# Patient Record
(demographics unavailable — no encounter records)

---

## 2017-05-07 NOTE — CP.PCM.HP
History of Present Illness





- History of Present Illness


History of Present Illness: 


Pt is a 39 y/o male with a past medical history of diverticulitis (3yrs ago) 

and pre-diabetes presents to the emergency department with a complaint of a 

lower abdominal pain for the past 2 days. Associated with tactile fever as per 

history from wife. Patient states pain had worsened since onset, is constant, 

and similar to pain when he had diverticulitis. wife states he tooks some 

Percocet and Amoxicillin (left over from previous conditions)with no relief. 

Denies nausea, vomiting, diarrhea, constipation, or urinary symptom





PCP- [facility]





Present on Admission





- Present on Admission


Any Indicators Present on Admission: No





Review of Systems





- Review of Systems


All systems: reviewed and no additional remarkable complaints except


Review of Systems: 





Per HPI 





Past Patient History





- Infectious Disease


Hx of Infectious Diseases: None





- Tetanus Immunizations


Tetanus Immunization: Unknown





- Past Medical History & Family History


Past Medical History?: Yes





- Past Social History


Alcohol: None


Drugs: Denies





- CARDIAC


Hx Cardiac Disorders: No





- PULMONARY


Hx Respiratory Disorders: No





- NEUROLOGICAL


Hx Neurological Disorder: Yes





- HEENT


Hx HEENT Problems: No





- RENAL


Hx Chronic Kidney Disease: No





- ENDOCRINE/METABOLIC


Hx Endocrine Disorders: No





- HEMATOLOGICAL/ONCOLOGICAL


Hx Blood Disorders: No





- INTEGUMENTARY


Hx Dermatological Problems: No





- MUSCULOSKELETAL/RHEUMATOLOGICAL


Hx Musculoskeletal Disorders: No





- GASTROINTESTINAL


Hx Diverticulitis: Yes





- GENITOURINARY/GYNECOLOGICAL


Hx Genitourinary Disorders: No





- PSYCHIATRIC


Hx Psychophysiologic Disorder: No





- SURGICAL HISTORY


Hx Surgeries: No





- ANESTHESIA


Hx Anesthesia: No





Meds


Allergies/Adverse Reactions: 


 Allergies











Allergy/AdvReac Type Severity Reaction Status Date / Time


 


FISH Allergy  RASH Verified 05/07/17 18:18


 


Fish Containing Products Allergy  RASH Verified 05/07/17 18:18














Physical Exam





- Constitutional


Appears: Non-toxic, No Acute Distress





- Head Exam


Head Exam: NORMOCEPHALIC





- Eye Exam


Eye Exam: Normal appearance





- ENT Exam


ENT Exam: Mucous Membranes Moist





- Respiratory Exam


Respiratory Exam: Clear to Auscultation Bilateral, NORMAL BREATHING PATTERN.  

absent: Rhonchi, Wheezes





- Cardiovascular Exam


Cardiovascular Exam: REGULAR RHYTHM, +S1, +S2





- GI/Abdominal Exam


GI & Abdominal Exam: Normal Bowel Sounds, Soft, Tenderness.  absent: Distended, 

Guarding





- Extremities Exam


Extremities exam: Negative for: calf tenderness, pedal edema





- Neurological Exam


Neurological exam: Alert, CN II-XII Intact, Oriented x3





Results





- Vital Signs


Recent Vital Signs: 





 Last Vital Signs











Temp  98.7 F   05/07/17 22:43


 


Pulse  88   05/07/17 22:43


 


Resp  20   05/07/17 22:43


 


BP  135/82   05/07/17 22:43


 


Pulse Ox  98   05/07/17 21:33














- Labs


Result Diagrams: 


 05/07/17 18:55





 05/07/17 18:55


Labs: 





 Laboratory Results - last 24 hr











  05/07/17 05/07/17 05/07/17





  18:55 18:55 18:55


 


WBC  13.6 H  


 


RBC  5.14  


 


Hgb  14.6  


 


Hct  42.4  


 


MCV  82.4  


 


MCH  28.4  


 


MCHC  34.4  


 


RDW  13.5  


 


Plt Count  285  


 


MPV  9.4  


 


Neut % (Auto)  67.4  


 


Lymph % (Auto)  18.6 L  


 


Mono % (Auto)  11.3 H  


 


Eos % (Auto)  2.2  


 


Baso % (Auto)  0.5  


 


Neut #  9.2 H  


 


Lymph #  2.5  


 


Mono #  1.5 H  


 


Eos #  0.3  


 


Baso #  0.1  


 


PT   


 


INR   


 


APTT   


 


Sodium   142 


 


Potassium   3.8 


 


Chloride   104 


 


Carbon Dioxide   25 


 


Anion Gap   17 


 


BUN   17 


 


Creatinine   0.9 


 


Est GFR ( Amer)   > 60 


 


Est GFR (Non-Af Amer)   > 60 


 


Random Glucose   100 


 


Lactic Acid    1.1


 


Calcium   9.0 


 


Total Bilirubin   0.3 


 


AST   22 


 


ALT   37 


 


Alkaline Phosphatase   88 


 


Total Protein   7.6 


 


Albumin   4.2 


 


Globulin   3.4 


 


Albumin/Globulin Ratio   1.2 


 


Lipase   34 














  05/07/17





  18:55


 


WBC 


 


RBC 


 


Hgb 


 


Hct 


 


MCV 


 


MCH 


 


MCHC 


 


RDW 


 


Plt Count 


 


MPV 


 


Neut % (Auto) 


 


Lymph % (Auto) 


 


Mono % (Auto) 


 


Eos % (Auto) 


 


Baso % (Auto) 


 


Neut # 


 


Lymph # 


 


Mono # 


 


Eos # 


 


Baso # 


 


PT  9.9


 


INR  0.95


 


APTT  27.5


 


Sodium 


 


Potassium 


 


Chloride 


 


Carbon Dioxide 


 


Anion Gap 


 


BUN 


 


Creatinine 


 


Est GFR ( Amer) 


 


Est GFR (Non-Af Amer) 


 


Random Glucose 


 


Lactic Acid 


 


Calcium 


 


Total Bilirubin 


 


AST 


 


ALT 


 


Alkaline Phosphatase 


 


Total Protein 


 


Albumin 


 


Globulin 


 


Albumin/Globulin Ratio 


 


Lipase 














Assessment & Plan





- Assessment and Plan (Free Text)


Assessment: 





39 y/o with previous history of diverticulitis being admitted for 

diverticulitis with no associated sepsis


Plan: 


Diverticulitis 


CT imaging positive for diverticulitis in sigmoid colon


Started on Cipro/flagyl


received a dose of Ampicillin in ED


IV fluids- received a bolus in ED, then D5NS at 100cc/hr


Pain control with Toradol 30 Q6hr for pain level 8-10, 15 Q6hrs pain level 4-7


Repeat Cbc in the am





Diet- NPO, till pain resolve





Dvt prophylaxis- SCDs (low risk)

## 2017-05-07 NOTE — ED PDOC
HPI: Abdomen


Time Seen by Provider: 17 18:23


Chief Complaint (Nursing): Abdominal Pain


Chief Complaint (Provider): Abdominal Pain


History Per: Patient


History/Exam Limitations: no limitations


Onset/Duration Of Symptoms: Days (x2 days)


Current Symptoms Are (Timing): Still Present


Additional Complaint(s): 





39 y/o male with a past medical history of diverticulitis and pre-diabetes 

presents to the emergency department with a complaint of a lower abdominal pain 

x2 days. Associated with tactile fever as per history from wife. Patient states 

pain had worsened since onset, is constant, and similar to pain when he had 

diverticulitis. Reports taking Percocet and Amoxicillin (left over from 

previous conditions)with no relief. Denies nausea, vomiting, diarrhea, 

constipation, or urinary symptom. 





PMD: [facility]





Past Medical History


Reviewed: Historical Data, Nursing Documentation, Vital Signs


Vital Signs: 


 Last Vital Signs











Temp  98.7 F   17 22:43


 


Pulse  88   17 22:43


 


Resp  20   17 22:43


 


BP  135/82   17 22:43


 


Pulse Ox  98   17 21:33














- Medical History


PMH: Diabetes (Border line), Diverticulitis, Migraine


   Denies: HIV, Hypercholesterolemia, Chronic Kidney Disease





- Surgical History


Surgical History: No Surg Hx





- Family History


Family History: States: Diabetes





- Social History


Current smoker - smoking cessation education provided: No


Alcohol: None


Drugs: Denies





- Home Medications


Home Medications: 


 Ambulatory Orders











 Medication  Instructions  Recorded


 


No Known Home Med  17














- Allergies


Allergies/Adverse Reactions: 


 Allergies











Allergy/AdvReac Type Severity Reaction Status Date / Time


 


FISH Allergy  RASH Verified 17 18:18


 


Fish Containing Products Allergy  RASH Verified 17 18:18














Review of Systems


ROS Statement: Except As Marked, All Systems Reviewed And Found Negative


Constitutional: Positive for: Fever (Tactile)


Gastrointestinal: Positive for: Abdominal Pain (Lower).  Negative for: Vomiting

, Diarrhea, Constipation


Genitourinary Male: Negative for: Dysuria, Frequency, Incontinence, Hematuria





Physical Exam





- Reviewed


Nursing Documentation Reviewed: Yes


Vital Signs Reviewed: Yes





- Physical Exam


Appears: Positive for: Well, Non-toxic, In Acute Distress (Mild painful distress

)


Head Exam: Positive for: ATRAUMATIC, NORMOCEPHALIC


Skin: Positive for: Normal Color, Warm, Dry


ENT: Positive for: Normal ENT Inspection.  Negative for: Pharyngeal Erythema


Neck: Positive for: Normal, Supple


Cardiovascular/Chest: Positive for: Regular Rate, Rhythm.  Negative for: 

Tachycardia


Respiratory: Positive for: Normal Breath Sounds.  Negative for: Accessory 

Muscle Use, Respiratory Distress


Gastrointestinal/Abdominal: Positive for: Soft, Tenderness (Tenderness to the 

suprapubic region and lower quadrant bilaterally), Guarding (Diffuse Voluntary)

.  Negative for: Mass, Distended, Rebound


Extremity: Positive for: Normal ROM.  Negative for: Pedal Edema


Neurologic/Psych: Positive for: Alert, Oriented





- Laboratory Results


Result Diagrams: 


 17 18:55





 17 18:55





- ECG


O2 Sat by Pulse Oximetry: 98 (RA)


Pulse Ox Interpretation: Normal





Medical Decision Making


Medical Decision Making: 


:


Time: 18:23


Initial impression: Abdominal Pain differential include but not limited to 

diverticulitis, appendicitis, colitis, and urinary tract infection. 


Initial plan:


--Abd Pelvis IV CT 


--COMP Metabolic Panel


--Lact Acid, Plasma Stat


--Lipase Stat


--ED urine Dipstick (POC)


--CBC w/ differential


--Partial Thromboplastin Time (COAG)


--Prothrombin Time (COAG)


--Omnipaque 50 mL


--Toradol 30 mg IV


--Blood Culture Stat


--IV Insertion 


 Accession No. : [id number]


Patient Name / ID : MAYNOR WILLIS  / [id number]


Exam Date : 2017 20:48:59 ( Approved )


Study Comment : 


Sex / Age : M  / 038Y





Creator : Bobo Hernandez MD


Dictator : 


 : 


Approver : Bobo Hernandez MD


Approver2 : 





Report Date : 2017 20:59:00


My Comment : 


********************************************************************************

***





[facility] Division of Radiology  


 [address] [place] [address]  


 Tel. no. [phone]   


 


 


Patient Name: ALBA MCGUIRE            Account #: [id number]  


Pt. Address: [address] [place] [address] Rec #: [id number]  


                    [place] [address]            Ordering Dr: Steve ALVA,

Nat BASS  


Pt Phone: [phone] CELL                             Order Location: 

[facility]  


: 1978 Male Age: 38            Order #: [id number]                     

                 


             Accession #: [id number]  


Reason for exam: abd pain   


 


 


 


 


 


 CT Scan  


 


 


ABD   PELVIS IV CONTRAST ONLY                              Exam Date: 17  


 


This imaging exam was performed at [facility] (formerly [facility])  


 *****ADDENDUM*****  


 


 Addendum created by Bobo Hernandez MD on 2017 9:04:44 PM EDT   


 


 Kidneys and ureters:  Too small to characterize lesion within RIGHT kidney.  

No   


 hydronephrosis.  


 Initial report created on 2017 8:59:36 PM EDT   


 


 EXAM:  


   CT Abdomen and Pelvis With Intravenous Contrast  


 


 CLINICAL HISTORY:  


   38 years old, male; Pain; Abdominal pain; Other: Bilateral lower abd pain  


 


 TECHNIQUE:  


   Axial computed tomography images of the abdomen and pelvis with intravenous 

  


 contrast.  This CT exam was performed using one or more of the following dose 

  


 reduction techniques:  automated exposure control, adjustment of the mA and/or

   


 kV according to patient size, and/or use of iterative reconstruction 

technique.  


   Coronal and sagittal reformatted images were created and reviewed.  


 


 CONTRAST:  


   100 mL of [id number] administered intravenously.  


 


 COMPARISON:  


   No relevant prior studies available.  


 


 FINDINGS:  


   Lower thorax:  Mild atelectasis.  


 


  ABDOMEN:  


   Liver:  Mild fatty infiltration.  Too small to characterize lesion.  


   Gallbladder and bile ducts:  No calcified stones.  No ductal dilation.  


   Pancreas:  No ductal dilation.  No mass.  


   Spleen:  No splenomegaly.  


   Adrenals:  No mass.  


   Kidneys and ureters:  No mass. No hydronephrosis.  


   Stomach and bowel:  Few diverticula within sigmoid colon.  Mild mural   


 thickening of short segment of proximal sigmoid colon.  Mild-to-moderate   


 stranding within adjacent fat.  No obstruction.  


   Appendix:  Normal caliber.  No inflammation.  


 


  PELVIS:  


   Bladder:  Unremarkable.  


   Reproductive:  Unremarkable as visualized.  


 


  ABDOMEN and PELVIS:  


   Intraperitoneal space:  No significant fluid collection.  No free air.  


   Bones/joints:  No acute fracture.  


   Soft tissues:  Unremarkable.  


   Vasculature:  Unremarkable.  No abdominal aortic aneurysm.  


   Lymph nodes:  No pathologically enlarged lymph nodes.  


 


 IMPRESSION:       


 1.  Findings compatible with acute diverticulitis of sigmoid colon.  Recommend

   


 endoscopy following resolution.    


 2.  Incidental/non-acute findings are described above.  


 


 Addendum Dictated By: Bobo Hernandez MD  


 Addendum Dictated Date   Time:  


 Addendum Signed by:Bobo Hernandez MD  


 Addendum signed Date   Time: 17  


 Addendum Transcribed By: MICHAEL  


 Addendum Transcribed Date   Time:   


 


AC02/TRUDY  


 


EXAM:  


   CT Abdomen and Pelvis With Intravenous Contrast  


 


 CLINICAL HISTORY:  


   38 years old, male; Pain; Abdominal pain; Other: Bilateral lower abd pain  


 


 TECHNIQUE:  


   Axial computed tomography images of the abdomen and pelvis with intravenous 

  


 contrast.  This CT exam was performed using one or more of the following dose 

  


 reduction techniques:  automated exposure control, adjustment of the mA and/or

   


 kV according to patient size, and/or use of iterative reconstruction 

technique.  


   Coronal and sagittal reformatted images were created and reviewed.  


 


 CONTRAST:  


   100 mL of [id number] administered intravenously.  


 


 COMPARISON:  


   No relevant prior studies available.  


 


 FINDINGS:  


   Lower thorax:  Mild atelectasis.  


 


  ABDOMEN:  


   Liver:  Mild fatty infiltration.  Too small to characterize lesion.  


   Gallbladder and bile ducts:  No calcified stones.  No ductal dilation.  


   Pancreas:  No ductal dilation.  No mass.  


   Spleen:  No splenomegaly.  


   Adrenals:  No mass.  


   Kidneys and ureters:  No mass. No hydronephrosis.  


   Stomach and bowel:  Few diverticula within sigmoid colon.  Mild mural   


 thickening of short segment of proximal sigmoid colon.  Mild-to-moderate   


 stranding within adjacent fat.  No obstruction.  


   Appendix:  Normal caliber.  No inflammation.  


 


  PELVIS:  


   Bladder:  Unremarkable.  


   Reproductive:  Unremarkable as visualized.  


 


  ABDOMEN and PELVIS:  


   Intraperitoneal space:  No significant fluid collection.  No free air.  


   Bones/joints:  No acute fracture.  


   Soft tissues:  Unremarkable.  


   Vasculature:  Unremarkable.  No abdominal aortic aneurysm.  


   Lymph nodes:  No pathologically enlarged lymph nodes.  


 


 IMPRESSION:       


 1.  Findings compatible with acute diverticulitis of sigmoid colon.  Recommend

   


 endoscopy following resolution.    


 2.  Incidental/non-acute findings are described above.  


 


                                                            Dictated By:  

Bobo Hernandez MD      


                                                            Dictated Date/Time:

  17  


                                                            Signed By: Bobo Hernandez MD  


                                                            Date Signed: 2059  


                                                Transcribed By: MICHAEL  


                                                            Transcribe Date/Time

: 17  


ACYP02/VRD


 On reeval pt persists, especially with palpation, movement. DW pt findings. Pt 

to be hospitalized for diverticulitis with severe pain. JOSEPH [facility] resident.








Scribe Attestation:


Documented by Fatimah Ochoa, acting as a scribe for Nat Wilson MD.





Provider Scribe Attestation:


All medical record entries made by the Scribe were at my direction and 

personally dictated by me. I have reviewed the chart and agree that the record 

accurately reflects my personal performance of the history, physical exam, 

medical decision making, and the department course for this patient. I have 

also personally directed, reviewed, and agree with the discharge instructions 

and disposition.





ED OBSERVATION


Date of observation admission: 17


Time of observation admission: 18:41





- Observation admission statement


Patient is being placed in observation because:: 





Abdominal pain.








- Goals of Observation


Goals of observation are:: 





extensive] work-up and serial abdominal exam. 





Disposition





- Clinical Impression


Clinical Impression: 


 Abdominal pain, Diverticulitis





Counseled Patient/Family Regarding: Studies Performed, Diagnosis





- Disposition


Disposition Time: 18:45


Condition: FAIR





- Pt Status Changed To:


Hospital Disposition Of: Observation





- POA


Present On Arrival: None

## 2017-05-07 NOTE — CT
EXAM:

  CT Abdomen and Pelvis With Intravenous Contrast



CLINICAL HISTORY:

  38 years old, male; Pain; Abdominal pain; Other: Bilateral lower abd pain



TECHNIQUE:

  Axial computed tomography images of the abdomen and pelvis with intravenous 

contrast.  This CT exam was performed using one or more of the following dose 

reduction techniques:  automated exposure control, adjustment of the mA and/or 

kV according to patient size, and/or use of iterative reconstruction technique.

  Coronal and sagittal reformatted images were created and reviewed.



CONTRAST:

  100 mL of [id number] administered intravenously.



COMPARISON:

  No relevant prior studies available.



FINDINGS:

  Lower thorax:  Mild atelectasis.



 ABDOMEN:

  Liver:  Mild fatty infiltration.  Too small to characterize lesion.

  Gallbladder and bile ducts:  No calcified stones.  No ductal dilation.

  Pancreas:  No ductal dilation.  No mass.

  Spleen:  No splenomegaly.

  Adrenals:  No mass.

  Kidneys and ureters:  No mass. No hydronephrosis.

  Stomach and bowel:  Few diverticula within sigmoid colon.  Mild mural 

thickening of short segment of proximal sigmoid colon.  Mild-to-moderate 

stranding within adjacent fat.  No obstruction.

  Appendix:  Normal caliber.  No inflammation.



 PELVIS:

  Bladder:  Unremarkable.

  Reproductive:  Unremarkable as visualized.



 ABDOMEN and PELVIS:

  Intraperitoneal space:  No significant fluid collection.  No free air.

  Bones/joints:  No acute fracture.

  Soft tissues:  Unremarkable.

  Vasculature:  Unremarkable.  No abdominal aortic aneurysm.

  Lymph nodes:  No pathologically enlarged lymph nodes.



IMPRESSION:     

1.  Findings compatible with acute diverticulitis of sigmoid colon.  Recommend 

endoscopy following resolution.  

2.  Incidental/non-acute findings are described above.

## 2017-05-08 NOTE — CP.PCM.PN
Subjective





- Date & Time of Evaluation


Date of Evaluation: 05/08/17


Time of Evaluation: 07:15





- Subjective


Subjective: 


This is a 37 y/o with history of diverticulosis with 2 previous episodes of 

diverticulitis admitted for recurrent acute diverticultis  IV antibiotics 

management.


Patient was seen and examined at bedside this morning. Still complaining of 

lower abdominal pain, mostly in his left lower abdominal, 3/10, and constant. 

Patient reports that the pain is worse with self- palpation of LLQ, increasing 

intensity to 7/10. Denies nausea, vomiting, chills, diarrheas or other 

complains. 





Objective





- Vital Signs/Intake and Output


Vital Signs (last 24 hours): 


 











Temp Pulse Resp BP Pulse Ox


 


 98.7 F   91 H  20   133/81   100 


 


 05/08/17 07:27  05/08/17 07:27  05/08/17 07:27  05/08/17 07:27  05/08/17 07:27











- Medications


Medications: 


 Current Medications





Metronidazole (Flagyl 500mg/100ml Ns)  100 mls @ 100 mls/hr IVPB Q8 LEORA


   Last Admin: 05/08/17 08:24 Dose:  100 mls/hr


Ciprofloxacin (Cipro 400mg/200ml Dsw)  400 mg in 200 mls @ 200 mls/hr IVPB Q12 

LEORA


Dextrose/Sodium Chloride (Dextrose 5%/0.9% Ns 1000 Ml)  1,000 mls @ 125 mls/hr 

IV .Q8H [facility]


   Stop: 05/08/17 23:24


   Last Admin: 05/08/17 06:34 Dose:  Not Given


Ketorolac Tromethamine (Toradol)  30 mg IVP Q6 PRN


   PRN Reason: Pain, severe (8-10)


Ketorolac Tromethamine (Toradol)  15 mg IVP Q6 PRN


   PRN Reason: Pain, moderate (4-7)


Lidocaine (Lidoderm)  1 ea TD DAILY [facility]











- Labs


Labs: 


 





 05/08/17 05:35 





 05/07/17 18:55 





 











PT  9.9 SECONDS (9.6-11.2)   05/07/17  18:55    


 


INR  0.95  (0.92-1.08)   05/07/17  18:55    


 


APTT  27.5 SECONDS (23.3-32.5)   05/07/17  18:55    














- Constitutional


Appears: Non-toxic, No Acute Distress





- Eye Exam


Eye Exam: Normal appearance





- ENT Exam


ENT Exam: Mucous Membranes Moist





- Respiratory Exam


Respiratory Exam: Clear to Ausculation Bilateral, NORMAL BREATHING PATTERN





- Cardiovascular Exam


Cardiovascular Exam: REGULAR RHYTHM, +S1, +S2





- GI/Abdominal Exam


GI & Abdominal Exam: Soft, Tenderness (Moderate tender to palpation of LLQ, RLQ 

and Hypogastrium, but no rigidity or guarding noted, no rebound tenderness), 

Normal Bowel Sounds





- Extremities Exam


Extremities Exam: Normal Inspection.  absent: Calf Tenderness, Full ROM





- Back Exam


Back Exam: absent: CVA tenderness (L), CVA tenderness (R)





- Neurological Exam


Neurological Exam: Alert, Awake, Oriented x3





- Psychiatric Exam


Psychiatric exam: Normal Affect, Normal Mood





Assessment and Plan





- Assessment and Plan (Free Text)


Assessment: 


This is a 37 y/o Male with PMHx of diverticulosis, and recurrent episodes of 

acute diverticulitis who was admitted for IV antibiotics to manage third 

episode of diverticulitis. 


Plan: 


Plan: 


Acute diverticulitis


-Afebrile, no Tachycardic 


-Persistent leukocytosis CBC


-NPO for now, will consider to advance diet base on clinical improvement


-Abdominal Ct scan showed : acute diverticulitis of sigmoid colon


-IV Hydration Dextrose 5%/NS 0.9 % 


-c/w Cipro 400 mg IV Q12 hrs Day #1


-c/w Flagyl 500 IV Q8 hours Day #1


-C/w with pain management w/Toradol PRN


-F/U blood culture


-F/U CBC, and BMP on 5/9/17 


-General surgery consult appreciated due to recurrent acute diverticulitis


-Consider GI consult and General surgery consult as outpatient for further 

management





DVT prophylaxis


SCDs while in bed, patient is ambulating, and low risk for DVT

## 2017-05-08 NOTE — CP.PCM.CON
<Meena Figueroa - Last Filed: 05/08/17 13:56>





History of Present Illness





- History of Present Illness


History of Present Illness: 





General Surgery - Dr. Jones





39 yo M w/ hx of diverticulitis 3 years ago, presents w/ LLQ abdominal pain 

since Friday.  Pt states the pain came on gradually, was located in the LLQ and 

suprapubic region, non-radiating, 8/10 at worst.  He describes it as similar to 

his previous episode of diverticulitis.  Pt also complains of a headache and 

admits to a subjective fever at  home.  He denies any Nausea/Vomiting, Chills, 

Diarrhea, Constipation, Dysuria, Hematuria, Hematochezia.  He states last BM 

was this morning and was normal.





PMH: pre-diabetic, Diverticulitis 3 yrs ago


PSH: Colonoscopy in January 2017 which showed internal hemorrhoids


Allergy to Fish








Review of Systems





- Review of Systems


All systems: reviewed and no additional remarkable complaints except (as per HPI

)





Past Patient History





- Infectious Disease


Hx of Infectious Diseases: None





- Tetanus Immunizations


Tetanus Immunization: Unknown





- Past Medical History & Family History


Past Medical History?: Yes





- Past Social History


Smoking Status: Never Smoked





- CARDIAC


Hx Cardiac Disorders: No





- PULMONARY


Hx Respiratory Disorders: No





- NEUROLOGICAL


Hx Neurological Disorder: Yes





- HEENT


Hx HEENT Problems: No





- RENAL


Hx Chronic Kidney Disease: No





- ENDOCRINE/METABOLIC


Hx Endocrine Disorders: Yes


Other/Comment: pre -diabetes





- HEMATOLOGICAL/ONCOLOGICAL


Hx Blood Disorders: No





- INTEGUMENTARY


Hx Dermatological Problems: No





- MUSCULOSKELETAL/RHEUMATOLOGICAL


Hx Falls: No





- GASTROINTESTINAL


Hx Diverticulitis: Yes





- GENITOURINARY/GYNECOLOGICAL


Hx Genitourinary Disorders: No





- PSYCHIATRIC


Hx Substance Use: No





- SURGICAL HISTORY


Hx Surgeries: No





- ANESTHESIA


Hx Anesthesia: No


Hx Anesthesia Reactions: No





Meds


Allergies/Adverse Reactions: 


 Allergies











Allergy/AdvReac Type Severity Reaction Status Date / Time


 


FISH Allergy  RASH Verified 05/07/17 18:18


 


Fish Containing Products Allergy  RASH Verified 05/07/17 18:18














- Medications


Medications: 


 Current Medications





Metronidazole (Flagyl 500mg/100ml Ns)  100 mls @ 100 mls/hr IVPB Q8 [facility]


   Last Admin: 05/08/17 08:24 Dose:  100 mls/hr


Ciprofloxacin (Cipro 400mg/200ml Dsw)  400 mg in 200 mls @ 200 mls/hr IVPB Q12 

[facility]


   Last Admin: 05/08/17 12:56 Dose:  200 mls/hr


Dextrose/Sodium Chloride (Dextrose 5%/0.9% Ns 1000 Ml)  1,000 mls @ 125 mls/hr 

IV .Q8H [facility]


   Stop: 05/08/17 23:24


   Last Admin: 05/08/17 06:34 Dose:  Not Given


Ketorolac Tromethamine (Toradol)  30 mg IVP Q6 PRN


   PRN Reason: Pain, severe (8-10)


Ketorolac Tromethamine (Toradol)  15 mg IVP Q6 PRN


   PRN Reason: Pain, moderate (4-7)


Lidocaine (Lidoderm)  1 ea TD DAILY [facility]


   Last Admin: 05/08/17 12:57 Dose:  1 ea











Physical Exam





- Constitutional


Appears: Well, No Acute Distress





- Head Exam


Head Exam: ATRAUMATIC, NORMAL INSPECTION, NORMOCEPHALIC





- Eye Exam


Eye Exam: EOMI, Normal appearance





- ENT Exam


ENT Exam: Mucous Membranes Moist





- Respiratory Exam


Respiratory Exam: NORMAL BREATHING PATTERN.  absent: Respiratory Distress





- Cardiovascular Exam


Cardiovascular Exam: REGULAR RHYTHM





- GI/Abdominal Exam


GI & Abdominal Exam: Distended (mild), Guarding, Rebound, Soft, Tenderness (LLQ

, RLQ and Suprapubic region).  absent: Hernia, Rigid





- Neurological Exam


Neurological exam: Alert, Oriented x3





- Psychiatric Exam


Psychiatric exam: Normal Affect, Normal Mood





- Skin


Skin Exam: Dry





Results





- Vital Signs


Recent Vital Signs: 


 Last Vital Signs











Temp  98.7 F   05/08/17 07:27


 


Pulse  91 H  05/08/17 07:27


 


Resp  20   05/08/17 07:27


 


BP  133/81   05/08/17 07:27


 


Pulse Ox  100   05/08/17 07:27














- Labs


Result Diagrams: 


 05/08/17 05:35





 05/07/17 18:55


Labs: 


 Laboratory Results - last 24 hr











  05/07/17 05/07/17 05/07/17





  18:55 18:55 18:55


 


WBC  13.6 H  


 


RBC  5.14  


 


Hgb  14.6  


 


Hct  42.4  


 


MCV  82.4  


 


MCH  28.4  


 


MCHC  34.4  


 


RDW  13.5  


 


Plt Count  285  


 


MPV  9.4  


 


Neut % (Auto)  67.4  


 


Lymph % (Auto)  18.6 L  


 


Mono % (Auto)  11.3 H  


 


Eos % (Auto)  2.2  


 


Baso % (Auto)  0.5  


 


Neut #  9.2 H  


 


Lymph #  2.5  


 


Mono #  1.5 H  


 


Eos #  0.3  


 


Baso #  0.1  


 


PT   


 


INR   


 


APTT   


 


Sodium   142 


 


Potassium   3.8 


 


Chloride   104 


 


Carbon Dioxide   25 


 


Anion Gap   17 


 


BUN   17 


 


Creatinine   0.9 


 


Est GFR ( Amer)   > 60 


 


Est GFR (Non-Af Amer)   > 60 


 


Random Glucose   100 


 


Lactic Acid    1.1


 


Calcium   9.0 


 


Total Bilirubin   0.3 


 


AST   22 


 


ALT   37 


 


Alkaline Phosphatase   88 


 


Total Protein   7.6 


 


Albumin   4.2 


 


Globulin   3.4 


 


Albumin/Globulin Ratio   1.2 


 


Lipase   34 














  05/07/17 05/08/17





  18:55 05:35


 


WBC   12.8 H


 


RBC   4.91


 


Hgb   13.9


 


Hct   40.5


 


MCV   82.6


 


MCH   28.3


 


MCHC   34.3


 


RDW   13.5


 


Plt Count   255


 


MPV  


 


Neut % (Auto)  


 


Lymph % (Auto)  


 


Mono % (Auto)  


 


Eos % (Auto)  


 


Baso % (Auto)  


 


Neut #  


 


Lymph #  


 


Mono #  


 


Eos #  


 


Baso #  


 


PT  9.9 


 


INR  0.95 


 


APTT  27.5 


 


Sodium  


 


Potassium  


 


Chloride  


 


Carbon Dioxide  


 


Anion Gap  


 


BUN  


 


Creatinine  


 


Est GFR ( Amer)  


 


Est GFR (Non-Af Amer)  


 


Random Glucose  


 


Lactic Acid  


 


Calcium  


 


Total Bilirubin  


 


AST  


 


ALT  


 


Alkaline Phosphatase  


 


Total Protein  


 


Albumin  


 


Globulin  


 


Albumin/Globulin Ratio  


 


Lipase  














- Imaging and Cardiology


  ** CT scan - abdomen


Status: Image reviewed by me, Report reviewed by me





Assessment & Plan





- Assessment and Plan (Free Text)


Assessment: 





39 yo M w/ recurrent acute diverticulitis, uncomplicated





-Maintain NPO, IVF


-IV Abx - Cipro/Flagyl


-Pain control


-Poss. Clear liquids tomorrow if improving


-Recc. GI consult for F/U, may need repeat colonoscopy


-No acute surgical plans, will follow





JOSEPH Figueroa PGY2





<Ori Jones - Last Filed: 05/10/17 22:04>





Results





- Vital Signs


Recent Vital Signs: 


 Last Vital Signs











Temp  98.1 F   05/10/17 08:08


 


Pulse  71   05/10/17 08:08


 


Resp  20   05/10/17 08:08


 


BP  134/90   05/10/17 08:08


 


Pulse Ox  99   05/10/17 08:08














- Labs


Result Diagrams: 


 05/10/17 05:45





 05/10/17 05:45


Labs: 


 Laboratory Results - last 24 hr











  05/10/17 05/10/17





  05:45 05:45


 


WBC   8.3


 


RBC   4.89


 


Hgb   13.6


 


Hct   41.3


 


MCV   84.4


 


MCH   27.9


 


MCHC   33.0


 


RDW   13.3


 


Plt Count   301


 


Potassium  3.8 














Attending/Attestation





- Attestation


I have personally seen and examined this patient.: Yes


I have fully participated in the care of the patient.: Yes


I have reviewed all pertinent clinical information: Yes


Notes (Text): 





05/10/17 22:03


Pt was seen and examined at bedside on 05/09/17


Agree with above note and assessment.


Pt with Sigmoid Diverticulitis


Improving clinically


C.w current mx


Plan d.w pt in detail


We will f.u

## 2017-05-09 NOTE — CP.PCM.PCO
Assessment/Plan





- Assessment and Plan (Free Text)


Assessment: 


I saw and evaluated the patient. I discussed the case with the resident and 

agree with the findings and plan as documented in the resident's note.


Wife in the room.  Pt says he continues to have pain.  I observed pt grimacing 

as he turned over in bed.  pt continues to have LLQ pain upon palpation.  Pt 

even had pain when I pushed with my stereoscope.  


Cont iv abx and liquid diet.

## 2017-05-09 NOTE — CP.PCM.PN
Subjective





- Date & Time of Evaluation


Date of Evaluation: 05/09/17


Time of Evaluation: 07:00





- Subjective


Subjective: 


Patient was seen and examined at bedside this morning. Patient is still 

complaining of lower abdominal pain mostly localized in left side, however has 

decreased in intensity. Denies nausea, vomiting, chills, diarrheas, urinary 

symptoms at this evaluation.Reports two normal consistency bowel movements 

yesterday. 





Objective





- Vital Signs/Intake and Output


Vital Signs (last 24 hours): 


 











Temp Pulse Resp BP Pulse Ox


 


 98.8 F   61   18   121/75   98 


 


 05/09/17 08:16  05/09/17 08:16  05/09/17 08:16  05/09/17 08:16  05/09/17 08:16











- Medications


Medications: 


 Current Medications





Acetaminophen (Tylenol 325mg Tab)  650 mg PO Q6 PRN


   PRN Reason: Pain, moderate (4-7)


Metronidazole (Flagyl 500mg/100ml Ns)  100 mls @ 100 mls/hr IVPB Q8 [facility]


   Last Admin: 05/09/17 08:44 Dose:  100 mls/hr


Ciprofloxacin (Cipro 400mg/200ml Dsw)  400 mg in 200 mls @ 200 mls/hr IVPB Q12 

[facility]


   Last Admin: 05/09/17 10:27 Dose:  200 mls/hr


Ketorolac Tromethamine (Toradol)  30 mg IVP Q6 PRN


   PRN Reason: Pain, severe (8-10)


   Last Admin: 05/09/17 01:50 Dose:  30 mg


Ketorolac Tromethamine (Toradol)  15 mg IVP Q6 PRN


   PRN Reason: Pain, moderate (4-7)


   Last Admin: 05/08/17 16:39 Dose:  15 mg


Lidocaine (Lidoderm)  1 ea TD DAILY [facility]


   Last Admin: 05/09/17 08:44 Dose:  1 ea











- Labs


Labs: 


 





 05/09/17 05:25 





 05/09/17 05:25 





 











PT  9.9 SECONDS (9.6-11.2)   05/07/17  18:55    


 


INR  0.95  (0.92-1.08)   05/07/17  18:55    


 


APTT  27.5 SECONDS (23.3-32.5)   05/07/17  18:55    














- Constitutional


Appears: Non-toxic, No Acute Distress





- Head Exam


Head Exam: NORMAL INSPECTION





- ENT Exam


ENT Exam: Mucous Membranes Moist





- Respiratory Exam


Respiratory Exam: Clear to Ausculation Bilateral, NORMAL BREATHING PATTERN





- Cardiovascular Exam


Cardiovascular Exam: RRR, +S1, +S2





- GI/Abdominal Exam


GI & Abdominal Exam: Soft, Tenderness (mild tender to palpation of LLQ, RLQ and 

hypogastrium, but no rebound tenderness noted), Normal Bowel Sounds.  absent: 

Distended, Rigid





- Extremities Exam


Extremities Exam: Normal Inspection.  absent: Calf Tenderness, Pedal Edema





- Back Exam


Back Exam: absent: CVA tenderness (L), CVA tenderness (R)





- Neurological Exam


Neurological Exam: Alert, Awake, Oriented x3





- Psychiatric Exam


Psychiatric exam: Normal Affect, Normal Mood





- Skin


Skin Exam: Dry, Intact, Normal Color





Assessment and Plan





- Assessment and Plan (Free Text)


Assessment: 


This is a 37 y/o Male with PMHx of diverticulosis, and recurrent episodes of 

acute diverticulitis who was admitted for IV antibiotics to manage third 

episode of diverticulitis, improving but still complaining of pain.


Plan: 


Acute diverticulitis


-Afebrile, no Tachycardic 


-resolved previous leukocytosis seen in CBC on admission. WBC:8.8 on 5/9/17 


-Advance to liquid diet for breakfast, and monitor tolerance and continue 

advancing diet as tolerated 


-c/w Cipro 400 mg IV Q12 hrs Day #1


-c/w Flagyl 500 IV Q8 hours Day #1


-C/w with pain management w/Toradol PRN


-discontinue IV Hydration Dextrose 5%/NS 0.9 % if PO tolerance


-F/U blood culture


Abdominal Ct scan showed : acute diverticulitis of sigmoid colon


-General surgery consult appreciated recommended continue with IV antibiotics 

and GI consult for colonoscopy as outpatient





Hypokalemia most like secondary to N/V


-Asymptomatic


- BMP on 5/9/17 : K+ 3.2


-Potassium 40 MEQ PO once


-F/U repeat K+ on 5/10/17


-Magnesium WNL





DVT prophylaxis


[id number] mg SC daily

## 2017-05-09 NOTE — CP.PCM.PN
<Yong Davidson - Last Filed: 05/09/17 07:36>





Subjective





- Date & Time of Evaluation


Date of Evaluation: 05/09/17


Time of Evaluation: 07:37





- Subjective


Subjective: 





General Surgery Progress Note For Dr. Jones 





This 38M was seen and examined this AM at bedside. Nurse reports no acute 

events overnight. Patient reports that he is no longer feeling pain. He 

required one dose of toradol overnight and it was due to a headache. He denies 

any fevers, chills, chest pain, SOB nausea vomiting or diarrhea. 





Objective





- Vital Signs/Intake and Output


Vital Signs (last 24 hours): 


 











Temp Pulse Resp BP Pulse Ox


 


 98.5 F   73   19   136/83   98 


 


 05/09/17 00:56  05/09/17 00:56  05/09/17 00:56  05/09/17 00:56  05/09/17 00:56











- Medications


Medications: 


 Current Medications





Acetaminophen (Tylenol 325mg Tab)  650 mg PO Q6 PRN


   PRN Reason: Pain, moderate (4-7)


Metronidazole (Flagyl 500mg/100ml Ns)  100 mls @ 100 mls/hr IVPB Q8 [facility]


   Last Admin: 05/09/17 01:21 Dose:  100 mls/hr


Ciprofloxacin (Cipro 400mg/200ml Dsw)  400 mg in 200 mls @ 200 mls/hr IVPB Q12 

[facility]


   Last Admin: 05/08/17 20:58 Dose:  200 mls/hr


Potassium Chloride (Potassium Chloride 10 Meq/100 Ml)  100 mls @ 100 mls/hr 

IVPB Q1 [facility]


   Stop: 05/09/17 09:59


Ketorolac Tromethamine (Toradol)  30 mg IVP Q6 PRN


   PRN Reason: Pain, severe (8-10)


   Last Admin: 05/09/17 01:50 Dose:  30 mg


Ketorolac Tromethamine (Toradol)  15 mg IVP Q6 PRN


   PRN Reason: Pain, moderate (4-7)


   Last Admin: 05/08/17 16:39 Dose:  15 mg


Lidocaine (Lidoderm)  1 ea TD DAILY [facility]


   Last Admin: 05/08/17 12:57 Dose:  1 ea











- Labs


Labs: 


 





 05/09/17 05:25 





 05/09/17 05:25 





 











PT  9.9 SECONDS (9.6-11.2)   05/07/17  18:55    


 


INR  0.95  (0.92-1.08)   05/07/17  18:55    


 


APTT  27.5 SECONDS (23.3-32.5)   05/07/17  18:55    














- Constitutional


Appears: Non-toxic, No Acute Distress





- Head Exam


Head Exam: ATRAUMATIC, NORMOCEPHALIC





- Eye Exam


Eye Exam: EOMI





- ENT Exam


ENT Exam: Mucous Membranes Moist, Normal Exam





- Respiratory Exam


Respiratory Exam: NORMAL BREATHING PATTERN





- Cardiovascular Exam


Cardiovascular Exam: REGULAR RHYTHM





- GI/Abdominal Exam


GI & Abdominal Exam: Soft.  absent: Firm, Guarding, Rigid, Tenderness





- Neurological Exam


Neurological Exam: Alert, Awake





- Psychiatric Exam


Psychiatric exam: Normal Affect, Normal Mood





- Skin


Skin Exam: Dry, Intact





Assessment and Plan





- Assessment and Plan (Free Text)


Assessment: 


37 yo M w/ recurrent acute diverticulitis, which is showing signs of resolution 

patient with potassium of 3.2 today





-CLD


-Mag level ordered 2 K-Riders 


-IV Abx 


-Cipro/Flagyl


-Pain control


-Recc. GI consult for F/U, may need repeat colonoscopy


-No acute surgical plans, will follow





JOSEPH Davidson PGY-1


[phone]











<Ori Jones - Last Filed: 05/10/17 22:06>





Objective





- Vital Signs/Intake and Output


Vital Signs (last 24 hours): 


 











Temp Pulse Resp BP Pulse Ox


 


 98.1 F   71   20   134/90   99 


 


 05/10/17 08:08  05/10/17 08:08  05/10/17 08:08  05/10/17 08:08  05/10/17 08:08











- Labs


Labs: 


 





 05/10/17 05:45 





 05/10/17 05:45 





 











PT  9.9 SECONDS (9.6-11.2)   05/07/17  18:55    


 


INR  0.95  (0.92-1.08)   05/07/17  18:55    


 


APTT  27.5 SECONDS (23.3-32.5)   05/07/17  18:55    














Attending/Attestation





- Attestation


I have personally seen and examined this patient.: Yes


I have fully participated in the care of the patient.: Yes


I have reviewed all pertinent clinical information, including history, physical 

exam and plan: Yes


Notes (Text): 





05/10/17 22:06


Pt was seen and examined at bedside on 05/09/17


Agree with above note and assessment.

## 2017-05-10 NOTE — CP.PCM.DIS
Provider





- Provider


Date of Admission: 


05/08/17 11:01





Attending physician: 


Layla Power MD





Time Spent in preparation of Discharge (in minutes): 30





Diagnosis





- Discharge Diagnosis


(1) Acute diverticulitis


Status: Acute   Priority: High   


Comment: Improved. Asymtomatic. Continue with Ciprofloxacin and Metronidazol PO 

to complate 10 more days of treatment. Prescriptions given. Prescription for 

pain control at home given. F/U with PMD, Surgery and GI as outpatient. 

Appointments given. ER precautions given.   





Hospital Course





- Lab Results


Lab Results: 


 Most Recent Lab Values











WBC  8.3 K/uL (4.8-10.8)   05/10/17  05:45    


 


RBC  4.89 Mil/uL (4.40-5.90)   05/10/17  05:45    


 


Hgb  13.6 g/dL (12.0-18.0)   05/10/17  05:45    


 


Hct  41.3 % (35.0-51.0)   05/10/17  05:45    


 


MCV  84.4 fl (80.0-94.0)   05/10/17  05:45    


 


MCH  27.9 pg (27.0-31.0)   05/10/17  05:45    


 


MCHC  33.0 g/dL (33.0-37.0)   05/10/17  05:45    


 


RDW  13.3 % (11.5-14.5)   05/10/17  05:45    


 


Plt Count  301 K/uL (130-400)   05/10/17  05:45    


 


MPV  9.4 fl (7.2-11.7)   05/07/17  18:55    


 


Neut % (Auto)  67.4 % (50.0-75.0)   05/07/17  18:55    


 


Lymph % (Auto)  18.6 % (20.0-40.0)  L  05/07/17  18:55    


 


Mono % (Auto)  11.3 % (0.0-10.0)  H  05/07/17  18:55    


 


Eos % (Auto)  2.2 % (0.0-4.0)   05/07/17  18:55    


 


Baso % (Auto)  0.5 % (0.0-2.0)   05/07/17  18:55    


 


Neut #  9.2 K/uL (1.8-7.0)  H  05/07/17  18:55    


 


Lymph #  2.5 K/uL (1.0-4.3)   05/07/17  18:55    


 


Mono #  1.5 K/uL (0.0-0.8)  H  05/07/17  18:55    


 


Eos #  0.3 K/uL (0.0-0.7)   05/07/17  18:55    


 


Baso #  0.1 K/uL (0.0-0.2)   05/07/17  18:55    


 


PT  9.9 SECONDS (9.6-11.2)   05/07/17  18:55    


 


INR  0.95  (0.92-1.08)   05/07/17  18:55    


 


APTT  27.5 SECONDS (23.3-32.5)   05/07/17  18:55    


 


Sodium  144 mmol/l (132-148)   05/09/17  05:25    


 


Potassium  3.8 MMOL/L (3.6-5.0)   05/10/17  05:45    


 


Chloride  106 mmol/L ()   05/09/17  05:25    


 


Carbon Dioxide  24 mmol/L (22-30)   05/09/17  05:25    


 


Anion Gap  17  (10-20)   05/09/17  05:25    


 


BUN  10 mg/dl (9-20)   05/09/17  05:25    


 


Creatinine  0.8 mg/dL (0.8-1.5)   05/09/17  05:25    


 


Est GFR ( Amer)  > 60   05/09/17  05:25    


 


Est GFR (Non-Af Amer)  > 60   05/09/17  05:25    


 


Random Glucose  115 mg/dL ()  H  05/09/17  05:25    


 


Lactic Acid  1.1 MMOL/L (0.7-2.1)   05/07/17  18:55    


 


Calcium  8.3 mg/dL (8.4-10.2)  L  05/09/17  05:25    


 


Magnesium  2.1 MG/DL (1.6-2.3)   05/09/17  08:45    


 


Total Bilirubin  0.4 mg/dl (0.2-1.3)   05/09/17  05:25    


 


AST  20 U/L (17-59)   05/09/17  05:25    


 


ALT  29 U/L (21-72)   05/09/17  05:25    


 


Alkaline Phosphatase  53 U/L ()   05/09/17  05:25    


 


Total Protein  6.4 G/DL (6.3-8.2)   05/09/17  05:25    


 


Albumin  3.7 g/dL (3.5-5.0)   05/09/17  05:25    


 


Globulin  2.7 gm/dL (2.2-3.9)   05/09/17  05:25    


 


Albumin/Globulin Ratio  1.4  (1.0-2.1)   05/09/17  05:25    


 


Lipase  34 U/L ()   05/07/17  18:55    


 


HIV-1 Ab Rapid Screen  Non reactive  (NON REAC)   05/09/17  05:25    














- Hospital Course


Hospital Course: 


This is a 37 y/o male with PMHx of Diverticulosis and 2 previous episodes of 

diverticulitis who presented to [facility] complaining of abdominal pain. Abdominal CT 

showed finding of acute diverticulitis of sigmoid colon and patient was 

admitted to manage recurrent diverticulitis with IV antibiotics. During 

admission patient was NPO, and diet was advance as tolerated, IV hydration 

while NPO, IV antibiotic, Ciprofloxacin and Metronidazole, and pain management 

with Toradol as needed. During admission leukocytosis found in CBC resolved in 

repeated CBC, electrolytes imbalance was replaced and resolved. Surgical team 

was consulted for evaluation due to recurrent diverticulitis. No acute surgical 

intervention was recommended  at this time, GI consult was recommended as 

outpatient for possible colonoscopy. Patient was seen and examined at bedside 

this morning. Patient denied abdominal pain, nausea, vomiting, diarrheas, blood 

in stool, or other complain. Patient is tolerating PO and bland diet well. 

Stable to be discharge home today on PO antibiotics and will follow as 

outpatient with PMD, Surgery and GI.





Home medications:


Cirprofloxacin 500 mg 1 tab BID PO for 10 days


Metronidazole 500 mg 1 tab TID PO for 10 days ( avoid alcohol intake)


Tramadol 50mg Q8 PRN





Appointments:


5/18/17 Surgery, Dr. Oreilly


5/19/17 at [facility]


8/25/17 GI, Dr. Patel





- Date & Time of H&P


Date of H&P: 05/07/17


Time of H&P: 18:55





Discharge Exam





- Head Exam


Head Exam: NORMAL INSPECTION





- ENT Exam


ENT Exam: Mucous Membranes Moist





- Respiratory Exam


Respiratory Exam: Clear to PA & Lateral, NORMAL BREATHING PATTERN, UNREMARKABLE





- Cardiovascular Exam


Cardiovascular Exam: REGULAR RHYTHM, +S1, +S2





- GI/Abdominal Exam


GI & Abdominal Exam: Normal Bowel Sounds, Soft, Tenderness (Very milf tender to 

palpation of LLQ, howevere improved from previous exam).  absent: Distended, 

Rebound, Rigid





- Extremities Exam


Extremities exam: normal inspection


Additional comments: 


No calf tenderness noted. Bala's sign negative bilateral. No edema in lower 

extremities. 





- Neurological Exam


Neurological exam: Alert, Oriented x3





- Psychiatric Exam


Psychiatric exam: Normal Affect, Normal Mood





- Skin


Skin Exam: Dry, Intact, Normal Color





Discharge Plan





- Discharge Medications


Prescriptions: 


Ciprofloxacin HCl [Cipro] 500 mg PO BID #20 tablet


Metronidazole 500 mg PO Q8H #30 tablet


traMADol [Ultram] 50 mg PO Q8H PRN #20 tab


 PRN Reason: Pain, Moderate (4-7)





- Follow Up Plan


Condition: FAIR


Disposition: HOME/ ROUTINE


Instructions:  Ciprofloxacin (By mouth), Metronidazole (By mouth), Tramadol (By 

mouth), Diverticulitis (DC), Low Fiber Diet (GEN), Diverticulitis Diet (DC)


Additional Instructions: 


Follow up in [facility] in 1 week. return to ER for fever, 

uncontrolled pain , n/v or diarrhea  Follow up with  on 5/18 at 10 AM. 

Follow up with Dr. Patel on August 25th at 8:30 AM

## 2017-05-10 NOTE — CP.PCM.PN
<Milton Santana - Last Filed: 05/10/17 07:57>





Subjective





- Date & Time of Evaluation


Date of Evaluation: 05/10/17


Time of Evaluation: 07:57





- Subjective


Subjective: 





Gen Surg:  Dr Jones








Pt S&E.  Yesterday evening pt had a recurrence of pain after being advanced to 

regular diet.  This morning that pain has resolved.  He denies N/V, F/C.  He 

has been passing flatus and having BMs.  He is OOB and ambulating.   





Explained to pt the important of remaining on liquid diet and/or soft diet for 

at least 2 weeks with dx of diverticulitis





Objective





- Vital Signs/Intake and Output


Vital Signs (last 24 hours): 


 











Temp Pulse Resp BP Pulse Ox


 


 98.1 F   84   19   119/82   96 


 


 05/10/17 00:31  05/10/17 00:31  05/10/17 00:31  05/10/17 00:31  05/10/17 00:31











- Medications


Medications: 


 Current Medications





Acetaminophen (Tylenol 325mg Tab)  650 mg PO Q6 PRN


   PRN Reason: Pain, moderate (4-7)


Enoxaparin Sodium (Lovenox)  40 mg SC DAILY [facility]


   PRN Reason: Protocol


   Last Admin: 05/09/17 21:17 Dose:  Not Given


Metronidazole (Flagyl 500mg/100ml Ns)  100 mls @ 100 mls/hr IVPB Q8 [facility]


   Last Admin: 05/10/17 01:32 Dose:  100 mls/hr


Ciprofloxacin (Cipro 400mg/200ml Dsw)  400 mg in 200 mls @ 200 mls/hr IVPB Q12 

[facility]


   Last Admin: 05/09/17 21:13 Dose:  200 mls/hr


Ketorolac Tromethamine (Toradol)  30 mg IVP Q6 PRN


   PRN Reason: Pain, severe (8-10)


   Last Admin: 05/09/17 22:40 Dose:  30 mg


Ketorolac Tromethamine (Toradol)  15 mg IVP Q6 PRN


   PRN Reason: Pain, moderate (4-7)


   Last Admin: 05/08/17 16:39 Dose:  15 mg


Lidocaine (Lidoderm)  1 ea TD DAILY [facility]


   Last Admin: 05/09/17 08:44 Dose:  1 ea











- Labs


Labs: 


 





 05/10/17 05:45 





 05/10/17 05:45 





 











PT  9.9 SECONDS (9.6-11.2)   05/07/17  18:55    


 


INR  0.95  (0.92-1.08)   05/07/17  18:55    


 


APTT  27.5 SECONDS (23.3-32.5)   05/07/17  18:55    














- Constitutional


Appears: Non-toxic, No Acute Distress





- Eye Exam


Eye Exam: Normal appearance





- Respiratory Exam


Respiratory Exam: absent: Accessory Muscle Use, Decreased Breath Sounds





- Cardiovascular Exam


Cardiovascular Exam: REGULAR RHYTHM





- GI/Abdominal Exam


GI & Abdominal Exam: Soft, Tenderness (LLQ but improved).  absent: Distended, 

Firm, Guarding, Rigid, Hernia, Mass





- Extremities Exam


Extremities Exam: absent: Pedal Edema





- Neurological Exam


Neurological Exam: Alert, Awake, Oriented x3





- Psychiatric Exam


Psychiatric exam: Normal Affect, Normal Mood





- Skin


Skin Exam: Normal Color, Warm





Assessment and Plan





- Assessment and Plan (Free Text)


Assessment: 





38M with diverticulitis; resolving


Plan: 





Remain on liquid or soft diet for at least two weeks


Pt clear for D/C from surgical standpoint


D/C with PO antibiotics


return to ED if symptoms worsen





will d/w attending





Milton Santana DO, PGY2





<Ori Jones B - Last Filed: 05/10/17 22:18>





Objective





- Vital Signs/Intake and Output


Vital Signs (last 24 hours): 


 











Temp Pulse Resp BP Pulse Ox


 


 98.1 F   71   20   134/90   99 


 


 05/10/17 08:08  05/10/17 08:08  05/10/17 08:08  05/10/17 08:08  05/10/17 08:08











- Labs


Labs: 


 





 05/10/17 05:45 





 05/10/17 05:45 





 











PT  9.9 SECONDS (9.6-11.2)   05/07/17  18:55    


 


INR  0.95  (0.92-1.08)   05/07/17  18:55    


 


APTT  27.5 SECONDS (23.3-32.5)   05/07/17  18:55    














Attending/Attestation





- Attestation


I have personally seen and examined this patient.: Yes


I have fully participated in the care of the patient.: Yes


I have reviewed all pertinent clinical information, including history, physical 

exam and plan: Yes


Notes (Text): 





05/10/17 22:17


Pt was seen and examined at bedside on 05/10/17


Agree with above note and assessment.


Pt with improving Diverticulitis


DC home with PO antibiotics


F.u as out pt